# Patient Record
Sex: FEMALE | Race: BLACK OR AFRICAN AMERICAN | NOT HISPANIC OR LATINO
[De-identification: names, ages, dates, MRNs, and addresses within clinical notes are randomized per-mention and may not be internally consistent; named-entity substitution may affect disease eponyms.]

---

## 2017-08-25 ENCOUNTER — APPOINTMENT (OUTPATIENT)
Dept: MRI IMAGING | Facility: CLINIC | Age: 82
End: 2017-08-25

## 2017-08-25 ENCOUNTER — OUTPATIENT (OUTPATIENT)
Dept: OUTPATIENT SERVICES | Facility: HOSPITAL | Age: 82
LOS: 1 days | End: 2017-08-25
Payer: COMMERCIAL

## 2017-08-25 DIAGNOSIS — Z00.8 ENCOUNTER FOR OTHER GENERAL EXAMINATION: ICD-10-CM

## 2017-08-25 PROCEDURE — 72148 MRI LUMBAR SPINE W/O DYE: CPT

## 2017-08-25 PROCEDURE — 72148 MRI LUMBAR SPINE W/O DYE: CPT | Mod: 26

## 2018-07-24 ENCOUNTER — APPOINTMENT (OUTPATIENT)
Dept: VASCULAR SURGERY | Facility: CLINIC | Age: 83
End: 2018-07-24
Payer: MEDICARE

## 2018-07-24 VITALS
HEART RATE: 61 BPM | DIASTOLIC BLOOD PRESSURE: 112 MMHG | WEIGHT: 235 LBS | BODY MASS INDEX: 39.15 KG/M2 | HEIGHT: 65 IN | SYSTOLIC BLOOD PRESSURE: 154 MMHG

## 2018-07-24 PROCEDURE — 93978 VASCULAR STUDY: CPT

## 2018-07-24 PROCEDURE — 99204 OFFICE O/P NEW MOD 45 MIN: CPT

## 2018-07-25 ENCOUNTER — APPOINTMENT (OUTPATIENT)
Dept: ORTHOPEDIC SURGERY | Facility: CLINIC | Age: 83
End: 2018-07-25
Payer: MEDICARE

## 2018-07-25 VITALS
BODY MASS INDEX: 39.61 KG/M2 | DIASTOLIC BLOOD PRESSURE: 84 MMHG | HEART RATE: 76 BPM | HEIGHT: 64 IN | SYSTOLIC BLOOD PRESSURE: 146 MMHG | WEIGHT: 232 LBS

## 2018-07-25 DIAGNOSIS — I71.4 ABDOMINAL AORTIC ANEURYSM, W/OUT RUPTURE: ICD-10-CM

## 2018-07-25 DIAGNOSIS — S42.295D OTHER NONDISPLACED FRACTURE OF UPPER END OF LEFT HUMERUS, SUBSEQUENT ENCOUNTER FOR FRACTURE WITH ROUTINE HEALING: ICD-10-CM

## 2018-07-25 PROCEDURE — 99203 OFFICE O/P NEW LOW 30 MIN: CPT

## 2018-07-25 PROCEDURE — 73030 X-RAY EXAM OF SHOULDER: CPT | Mod: LT

## 2018-07-25 RX ORDER — DENOSUMAB 60 MG/ML
60 INJECTION SUBCUTANEOUS
Refills: 0 | Status: ACTIVE | COMMUNITY

## 2019-07-03 ENCOUNTER — APPOINTMENT (OUTPATIENT)
Dept: ORTHOPEDIC SURGERY | Facility: CLINIC | Age: 84
End: 2019-07-03
Payer: MEDICARE

## 2019-07-03 VITALS
HEIGHT: 64 IN | WEIGHT: 232 LBS | HEART RATE: 71 BPM | BODY MASS INDEX: 39.61 KG/M2 | DIASTOLIC BLOOD PRESSURE: 89 MMHG | SYSTOLIC BLOOD PRESSURE: 136 MMHG

## 2019-07-03 PROCEDURE — 99214 OFFICE O/P EST MOD 30 MIN: CPT

## 2019-07-03 NOTE — DISCUSSION/SUMMARY
[de-identified] : We discussed nonsurgical and surgical options. She like to be evaluated for further epidural injections. She will be referred for this. Follow up after/

## 2019-07-03 NOTE — PHYSICAL EXAM
[Cane] : ambulates with cane [Antalgic] : antalgic [de-identified] : Examination of the lumbar spine reveals no midline tenderness palpation, step-offs, or skin lesions. Decreased range of motion with respect to flexion, extension, lateral bending, and rotation. No tenderness to palpation of the sciatic notch. No tenderness palpation of the bilateral greater trochanters. No pain with passive internal/external rotation of the hips. No instability of bilateral lower extremities.  Negative ELISABET. Negative straight leg raise bilaterally. No bowstring. Negative femoral stretch. 5 out of 5 iliopsoas, hip abductors, hips adductors, quadriceps, hamstrings, gastrocsoleus, tibialis anterior, extensor hallucis longus, peroneals. Grossly intact sensation to light touch bilateral lower extremities. 1+ patellar and Achilles reflexes. Downgoing Babinski. No clonus. Intact proprioception. Palpable pulses. No skin lesion and no edema on the right and left lower extremities. [de-identified] : Review of her MRI from 2017 reveals L4-5 spondylolisthesis with stenosis

## 2019-07-03 NOTE — HISTORY OF PRESENT ILLNESS
[de-identified] : Ms. IAN HAMMONDS  is a 84 year old female who presents with a chronic history of back and bilateral leg pain.  She feels her pain has gotten worse with time.  Last year she did 4 injections with minimal relief.  Normal bowel and bladder control.   Denies any recent fevers, chills, sweats, weight loss, or infection.\par

## 2019-07-26 ENCOUNTER — APPOINTMENT (OUTPATIENT)
Dept: NEUROSURGERY | Facility: CLINIC | Age: 84
End: 2019-07-26
Payer: MEDICARE

## 2019-07-26 VITALS
SYSTOLIC BLOOD PRESSURE: 130 MMHG | HEART RATE: 67 BPM | BODY MASS INDEX: 39.61 KG/M2 | HEIGHT: 64 IN | WEIGHT: 232 LBS | DIASTOLIC BLOOD PRESSURE: 83 MMHG

## 2019-07-26 PROCEDURE — 99203 OFFICE O/P NEW LOW 30 MIN: CPT

## 2019-07-26 RX ORDER — PRAVASTATIN SODIUM 80 MG/1
80 TABLET ORAL DAILY
Refills: 0 | Status: ACTIVE | COMMUNITY

## 2019-07-26 RX ORDER — EZETIMIBE 10 MG/1
10 TABLET ORAL DAILY
Refills: 0 | Status: ACTIVE | COMMUNITY

## 2019-07-26 NOTE — PHYSICAL EXAM
[General Appearance - Alert] : alert [Mood] : the mood was normal [Motor Strength] : muscle strength was normal in all four extremities [Sensation Tactile Decrease] : light touch was intact [2+] : Ankle jerk right 2+ [Straight-Leg Raise Test - Left] : straight leg raise of the left leg was negative [Straight-Leg Raise Test - Right] : straight leg raise  of the right leg was negative [Able to toe walk] : the patient was not able to toe walk [Able to heel walk] : the patient was not able to heel walk [No Spinal Tenderness] : no spinal tenderness [FreeTextEntry1] : pain with lumbar extension [] : no rash

## 2019-07-26 NOTE — CONSULT LETTER
[Dear  ___] : Dear ~RON, [Consult Letter:] : I had the pleasure of evaluating your patient, [unfilled]. [Please see my note below.] : Please see my note below. [Consult Closing:] : Thank you very much for allowing me to participate in the care of this patient.  If you have any questions, please do not hesitate to contact me. [Sincerely,] : Sincerely, [FreeTextEntry2] : Elias Escobar MD\par [address]\[place] [address] [FreeTextEntry3] : Kuldeep

## 2019-07-26 NOTE — ASSESSMENT
[FreeTextEntry1] : 84 year old female with low back and lumbar radicular pain secondary to stenosis.  Given the nature of the patient's complaints and significant improvement following injections in the past, we did discuss lumbar epidural steroid injection.  Risks, benefits, and expectations of the procedure were reviewed.  The patient was provided with an educational pamphlet outlining the details of the procedure so that he/she may have the ability to review the information prior to proceeding.  The patient has agreed to proceed and will follow up with me for the procedure.\par

## 2019-07-30 ENCOUNTER — APPOINTMENT (OUTPATIENT)
Dept: VASCULAR SURGERY | Facility: CLINIC | Age: 84
End: 2019-07-30
Payer: MEDICARE

## 2019-07-30 PROCEDURE — 93978 VASCULAR STUDY: CPT

## 2019-07-30 PROCEDURE — 99213 OFFICE O/P EST LOW 20 MIN: CPT

## 2019-07-30 NOTE — HISTORY OF PRESENT ILLNESS
[FreeTextEntry1] : 83 yo female with history of aaa presents for evaluation.  pt had seen another vascular surgeon in the past who had been monitoring.  last duplex shows aaa measuring 3.8cm.  pt without any complaints at this time, presents to establish care\par

## 2019-07-30 NOTE — PHYSICAL EXAM
[JVD] : no jugular venous distention  [Normal Breath Sounds] : Normal breath sounds [Normal Rate and Rhythm] : normal rate and rhythm [2+] : left 2+ [Ankle Swelling (On Exam)] : not present [Varicose Veins Of Lower Extremities] : not present [] : not present [Abdomen Tenderness] : ~T ~M No abdominal tenderness [No Rash or Lesion] : No rash or lesion [Skin Ulcer] : no ulcer [Alert] : alert [de-identified] : appears well  [Calm] : calm [de-identified] : sensorimotor intact

## 2019-07-30 NOTE — ASSESSMENT
[FreeTextEntry1] : 83 yo female with history of aaa presents to establish care.  duplex completed and shows stable aaa of 3.9 cm \par pt to follow up in 1 year with repeat duplex

## 2019-08-07 ENCOUNTER — TRANSCRIPTION ENCOUNTER (OUTPATIENT)
Age: 84
End: 2019-08-07

## 2019-08-08 ENCOUNTER — APPOINTMENT (OUTPATIENT)
Dept: NEUROSURGERY | Facility: CLINIC | Age: 84
End: 2019-08-08
Payer: MEDICARE

## 2019-08-08 ENCOUNTER — OUTPATIENT (OUTPATIENT)
Dept: OUTPATIENT SERVICES | Facility: HOSPITAL | Age: 84
LOS: 1 days | End: 2019-08-08
Payer: COMMERCIAL

## 2019-08-08 DIAGNOSIS — M54.16 RADICULOPATHY, LUMBAR REGION: ICD-10-CM

## 2019-08-08 PROCEDURE — 62323 NJX INTERLAMINAR LMBR/SAC: CPT

## 2019-08-09 DIAGNOSIS — M54.17 RADICULOPATHY, LUMBOSACRAL REGION: ICD-10-CM

## 2019-08-27 ENCOUNTER — APPOINTMENT (OUTPATIENT)
Dept: NEUROSURGERY | Facility: CLINIC | Age: 84
End: 2019-08-27
Payer: MEDICARE

## 2019-08-27 VITALS
DIASTOLIC BLOOD PRESSURE: 80 MMHG | BODY MASS INDEX: 39.61 KG/M2 | WEIGHT: 232 LBS | HEART RATE: 59 BPM | SYSTOLIC BLOOD PRESSURE: 154 MMHG | HEIGHT: 64 IN

## 2019-08-27 PROCEDURE — 99213 OFFICE O/P EST LOW 20 MIN: CPT

## 2019-08-27 NOTE — REASON FOR VISIT
[Follow-Up: _____] : a [unfilled] follow-up visit [FreeTextEntry1] : Patient returns after her first LESI a few weeks ago.  She states she is 50% improved.  She is here to discuss her progress.

## 2019-08-27 NOTE — ASSESSMENT
[FreeTextEntry1] : 84 year old female with low back pain and lumbar radicular pain now moderately improved following her first LESI.  She is agreeable to a repeat injection and will follow up with me next week for her procedure.

## 2019-09-04 ENCOUNTER — TRANSCRIPTION ENCOUNTER (OUTPATIENT)
Age: 84
End: 2019-09-04

## 2019-09-05 ENCOUNTER — OUTPATIENT (OUTPATIENT)
Dept: OUTPATIENT SERVICES | Facility: HOSPITAL | Age: 84
LOS: 1 days | End: 2019-09-05
Payer: COMMERCIAL

## 2019-09-05 ENCOUNTER — APPOINTMENT (OUTPATIENT)
Dept: NEUROSURGERY | Facility: CLINIC | Age: 84
End: 2019-09-05
Payer: MEDICARE

## 2019-09-05 DIAGNOSIS — M54.16 RADICULOPATHY, LUMBAR REGION: ICD-10-CM

## 2019-09-05 PROCEDURE — 62323 NJX INTERLAMINAR LMBR/SAC: CPT

## 2019-09-06 DIAGNOSIS — M54.17 RADICULOPATHY, LUMBOSACRAL REGION: ICD-10-CM

## 2019-09-27 ENCOUNTER — APPOINTMENT (OUTPATIENT)
Dept: NEUROSURGERY | Facility: CLINIC | Age: 84
End: 2019-09-27
Payer: MEDICARE

## 2019-09-27 VITALS
WEIGHT: 243 LBS | BODY MASS INDEX: 41.48 KG/M2 | SYSTOLIC BLOOD PRESSURE: 125 MMHG | DIASTOLIC BLOOD PRESSURE: 82 MMHG | HEART RATE: 61 BPM | HEIGHT: 64 IN

## 2019-09-27 PROCEDURE — 99213 OFFICE O/P EST LOW 20 MIN: CPT

## 2019-09-27 NOTE — REASON FOR VISIT
[Follow-Up: _____] : a [unfilled] follow-up visit [FreeTextEntry1] : Patient returns after her second LESI a few weeks ago.  She states that she feels better in terms of her lower back.  She is interested to discuss a third and final injection in this series.

## 2019-09-27 NOTE — ASSESSMENT
[FreeTextEntry1] : 84 year old female with low back and lumbar radicular pain now improved following her series of 2 injections.  She would like to proceed with a third and final injection in this series. She will follow up with me next week for her treatment.

## 2019-10-06 ENCOUNTER — TRANSCRIPTION ENCOUNTER (OUTPATIENT)
Age: 84
End: 2019-10-06

## 2019-10-07 ENCOUNTER — OUTPATIENT (OUTPATIENT)
Dept: OUTPATIENT SERVICES | Facility: HOSPITAL | Age: 84
LOS: 1 days | End: 2019-10-07
Payer: COMMERCIAL

## 2019-10-07 ENCOUNTER — APPOINTMENT (OUTPATIENT)
Dept: NEUROSURGERY | Facility: CLINIC | Age: 84
End: 2019-10-07
Payer: MEDICARE

## 2019-10-07 DIAGNOSIS — M54.16 RADICULOPATHY, LUMBAR REGION: ICD-10-CM

## 2019-10-07 PROCEDURE — 62323 NJX INTERLAMINAR LMBR/SAC: CPT

## 2019-10-08 DIAGNOSIS — M54.17 RADICULOPATHY, LUMBOSACRAL REGION: ICD-10-CM

## 2019-10-25 ENCOUNTER — APPOINTMENT (OUTPATIENT)
Dept: NEUROSURGERY | Facility: CLINIC | Age: 84
End: 2019-10-25
Payer: MEDICARE

## 2019-10-25 VITALS
BODY MASS INDEX: 41.48 KG/M2 | SYSTOLIC BLOOD PRESSURE: 146 MMHG | HEIGHT: 64 IN | DIASTOLIC BLOOD PRESSURE: 85 MMHG | HEART RATE: 66 BPM | WEIGHT: 243 LBS

## 2019-10-25 PROCEDURE — 99213 OFFICE O/P EST LOW 20 MIN: CPT

## 2019-10-25 NOTE — ASSESSMENT
[FreeTextEntry1] : 84 year old female with low back and lumbar radicular pain now significantly improved following a series of 3 LESIs.  She will continue with her home exercises and return to see me at the earliest sign that her pain returns for further injection therapy as necessary.

## 2019-10-25 NOTE — REASON FOR VISIT
[Follow-Up: _____] : a [unfilled] follow-up visit [FreeTextEntry1] : Patient returns after her third and final injection.  She feels that her pain is 1-2/10.  She is quite pleased with the results and returns for a follow up visit today.

## 2020-10-22 ENCOUNTER — APPOINTMENT (OUTPATIENT)
Dept: VASCULAR SURGERY | Facility: CLINIC | Age: 85
End: 2020-10-22
Payer: MEDICARE

## 2020-10-22 VITALS — TEMPERATURE: 96.4 F

## 2020-10-22 PROCEDURE — 99213 OFFICE O/P EST LOW 20 MIN: CPT

## 2020-10-22 PROCEDURE — 99072 ADDL SUPL MATRL&STAF TM PHE: CPT

## 2020-10-22 PROCEDURE — 93978 VASCULAR STUDY: CPT

## 2020-10-25 NOTE — ASSESSMENT
[FreeTextEntry1] : 84 yo female with history of aaa presents to establish care.  duplex completed and shows stable aaa of 3.9 cm \par pt to follow up in 1 year with repeat duplex

## 2020-10-25 NOTE — HISTORY OF PRESENT ILLNESS
[FreeTextEntry1] : 86 yo female with history of aaa presents for evaluation.  pt had seen another vascular surgeon in the past who had been monitoring.  last duplex shows aaa measuring 3.8cm.  pt without any complaints at this time, presents to establish care\par

## 2020-10-25 NOTE — PHYSICAL EXAM
[JVD] : no jugular venous distention  [Normal Breath Sounds] : Normal breath sounds [Normal Rate and Rhythm] : normal rate and rhythm [2+] : left 2+ [Ankle Swelling (On Exam)] : not present [Varicose Veins Of Lower Extremities] : not present [] : not present [Abdomen Tenderness] : ~T ~M No abdominal tenderness [No Rash or Lesion] : No rash or lesion [Skin Ulcer] : no ulcer [Alert] : alert [Calm] : calm [de-identified] : appears well  [de-identified] : sensorimotor intact

## 2021-08-24 ENCOUNTER — APPOINTMENT (OUTPATIENT)
Dept: NEUROSURGERY | Facility: CLINIC | Age: 86
End: 2021-08-24
Payer: MEDICARE

## 2021-08-24 VITALS
HEART RATE: 81 BPM | WEIGHT: 215 LBS | HEIGHT: 64 IN | OXYGEN SATURATION: 97 % | DIASTOLIC BLOOD PRESSURE: 89 MMHG | SYSTOLIC BLOOD PRESSURE: 142 MMHG | BODY MASS INDEX: 36.7 KG/M2

## 2021-08-24 PROCEDURE — 99214 OFFICE O/P EST MOD 30 MIN: CPT

## 2021-08-24 NOTE — REASON FOR VISIT
[Follow-Up: _____] : a [unfilled] follow-up visit [FreeTextEntry1] : Patient returns after being seen in October 2019.  She is interested in a repeat injection.

## 2021-08-24 NOTE — ASSESSMENT
[FreeTextEntry1] : 86 year old female with low back and lumbar radicular pain now returned.  She will follow up with me next week for her procedure.

## 2021-08-27 ENCOUNTER — APPOINTMENT (OUTPATIENT)
Dept: DISASTER EMERGENCY | Facility: CLINIC | Age: 86
End: 2021-08-27

## 2021-08-28 LAB — SARS-COV-2 N GENE NPH QL NAA+PROBE: NOT DETECTED

## 2021-08-29 ENCOUNTER — TRANSCRIPTION ENCOUNTER (OUTPATIENT)
Age: 86
End: 2021-08-29

## 2021-08-30 ENCOUNTER — APPOINTMENT (OUTPATIENT)
Dept: NEUROSURGERY | Facility: CLINIC | Age: 86
End: 2021-08-30
Payer: MEDICARE

## 2021-08-30 ENCOUNTER — OUTPATIENT (OUTPATIENT)
Dept: OUTPATIENT SERVICES | Facility: HOSPITAL | Age: 86
LOS: 1 days | End: 2021-08-30
Payer: COMMERCIAL

## 2021-08-30 DIAGNOSIS — M54.16 RADICULOPATHY, LUMBAR REGION: ICD-10-CM

## 2021-08-30 PROCEDURE — 62323 NJX INTERLAMINAR LMBR/SAC: CPT

## 2021-09-15 ENCOUNTER — APPOINTMENT (OUTPATIENT)
Dept: NEUROSURGERY | Facility: CLINIC | Age: 86
End: 2021-09-15
Payer: MEDICARE

## 2021-09-15 PROCEDURE — 99214 OFFICE O/P EST MOD 30 MIN: CPT

## 2021-09-15 NOTE — REASON FOR VISIT
[Follow-Up: _____] : a [unfilled] follow-up visit [FreeTextEntry1] : Patient returns after the first LESI a few weeks ago.  The patient reports 20% improvement in the symptoms.  The patient returns for a follow up visit today.\par

## 2021-09-15 NOTE — ASSESSMENT
[FreeTextEntry1] : 86 year old female with low back and lumbar radicular pain now minimally improved following her first LESI.  She is agreeable to a repeat procedure and will follow up with me next week for her injection.

## 2021-09-19 DIAGNOSIS — Z01.818 ENCOUNTER FOR OTHER PREPROCEDURAL EXAMINATION: ICD-10-CM

## 2021-09-20 ENCOUNTER — APPOINTMENT (OUTPATIENT)
Dept: DISASTER EMERGENCY | Facility: CLINIC | Age: 86
End: 2021-09-20

## 2021-09-21 LAB — SARS-COV-2 N GENE NPH QL NAA+PROBE: NOT DETECTED

## 2021-09-22 ENCOUNTER — TRANSCRIPTION ENCOUNTER (OUTPATIENT)
Age: 86
End: 2021-09-22

## 2021-09-23 ENCOUNTER — OUTPATIENT (OUTPATIENT)
Dept: OUTPATIENT SERVICES | Facility: HOSPITAL | Age: 86
LOS: 1 days | End: 2021-09-23
Payer: COMMERCIAL

## 2021-09-23 ENCOUNTER — APPOINTMENT (OUTPATIENT)
Dept: NEUROSURGERY | Facility: CLINIC | Age: 86
End: 2021-09-23
Payer: MEDICARE

## 2021-09-23 DIAGNOSIS — M54.17 RADICULOPATHY, LUMBOSACRAL REGION: ICD-10-CM

## 2021-09-23 PROCEDURE — 62323 NJX INTERLAMINAR LMBR/SAC: CPT

## 2021-10-06 ENCOUNTER — APPOINTMENT (OUTPATIENT)
Dept: NEUROSURGERY | Facility: CLINIC | Age: 86
End: 2021-10-06
Payer: MEDICARE

## 2021-10-06 VITALS
DIASTOLIC BLOOD PRESSURE: 92 MMHG | OXYGEN SATURATION: 98 % | HEIGHT: 64 IN | WEIGHT: 215 LBS | HEART RATE: 64 BPM | BODY MASS INDEX: 36.7 KG/M2 | SYSTOLIC BLOOD PRESSURE: 161 MMHG

## 2021-10-06 DIAGNOSIS — M48.07 SPINAL STENOSIS, LUMBOSACRAL REGION: ICD-10-CM

## 2021-10-06 DIAGNOSIS — M43.16 SPONDYLOLISTHESIS, LUMBAR REGION: ICD-10-CM

## 2021-10-06 DIAGNOSIS — M54.16 RADICULOPATHY, LUMBAR REGION: ICD-10-CM

## 2021-10-06 DIAGNOSIS — M47.817 SPONDYLOSIS W/OUT MYELOPATHY OR RADICULOPATHY, LUMBOSACRAL REGION: ICD-10-CM

## 2021-10-06 PROCEDURE — 99213 OFFICE O/P EST LOW 20 MIN: CPT

## 2021-10-06 NOTE — ASSESSMENT
[FreeTextEntry1] : 86 year old female with low back and lumbar radicular pain now significantly improved following her most recent injection.  She will return to see me at the earliest sign that her pain returns for further injection therapy as necessary.

## 2021-10-06 NOTE — REASON FOR VISIT
[Follow-Up: _____] : a [unfilled] follow-up visit [FreeTextEntry1] : Patient returns after her most recent LESI a few weeks ago.  The patient reports significant improvement in the symptoms.  The patient is pleased with the results and returns for a follow up visit today.\par

## 2021-10-28 ENCOUNTER — APPOINTMENT (OUTPATIENT)
Dept: VASCULAR SURGERY | Facility: CLINIC | Age: 86
End: 2021-10-28
Payer: MEDICARE

## 2021-10-28 VITALS
WEIGHT: 215 LBS | BODY MASS INDEX: 36.7 KG/M2 | HEART RATE: 63 BPM | DIASTOLIC BLOOD PRESSURE: 102 MMHG | SYSTOLIC BLOOD PRESSURE: 159 MMHG | HEIGHT: 64 IN

## 2021-10-28 PROCEDURE — 93978 VASCULAR STUDY: CPT

## 2021-10-28 PROCEDURE — 99213 OFFICE O/P EST LOW 20 MIN: CPT

## 2021-10-28 NOTE — ASSESSMENT
[FreeTextEntry1] : 85 yo female with history of aaa presents for follow up without any new complaints\par \par duplex shows aaa measuring 4.2 cm at the juxta renal, 3.7 cm suprarenal and 2.4 infrarenal \par \par will continue to monitor pt to follow up in 6 months with repeat duplex

## 2021-10-28 NOTE — HISTORY OF PRESENT ILLNESS
[FreeTextEntry1] : 85 yo female with history of aaa presents for follow up.  pt had seen another vascular surgeon in the past who had been monitoring.  last duplex shows aaa measuring 3.8cm-3.9cm.  pt without any complaints at this time\par

## 2021-11-09 ENCOUNTER — TRANSCRIPTION ENCOUNTER (OUTPATIENT)
Age: 86
End: 2021-11-09

## 2022-04-28 ENCOUNTER — APPOINTMENT (OUTPATIENT)
Dept: VASCULAR SURGERY | Facility: CLINIC | Age: 87
End: 2022-04-28